# Patient Record
Sex: MALE | Race: WHITE | NOT HISPANIC OR LATINO | Employment: UNEMPLOYED | URBAN - METROPOLITAN AREA
[De-identification: names, ages, dates, MRNs, and addresses within clinical notes are randomized per-mention and may not be internally consistent; named-entity substitution may affect disease eponyms.]

---

## 2023-03-31 ENCOUNTER — HOSPITAL ENCOUNTER (OUTPATIENT)
Dept: DATA CONVERSION | Facility: HOSPITAL | Age: 33
End: 2023-03-31
Attending: SURGERY | Admitting: SURGERY

## 2023-03-31 DIAGNOSIS — M79.89 OTHER SPECIFIED SOFT TISSUE DISORDERS: ICD-10-CM

## 2023-03-31 DIAGNOSIS — Z53.9 PROCEDURE AND TREATMENT NOT CARRIED OUT, UNSPECIFIED REASON: ICD-10-CM

## 2023-03-31 LAB
ANION GAP IN SER/PLAS: 11 MMOL/L (ref 10–20)
CALCIUM (MG/DL) IN SER/PLAS: 8.9 MG/DL (ref 8.6–10.3)
CARBON DIOXIDE, TOTAL (MMOL/L) IN SER/PLAS: 29 MMOL/L (ref 21–32)
CHLORIDE (MMOL/L) IN SER/PLAS: 99 MMOL/L (ref 98–107)
CREATININE (MG/DL) IN SER/PLAS: 0.72 MG/DL (ref 0.5–1.3)
ERYTHROCYTE DISTRIBUTION WIDTH (RATIO) BY AUTOMATED COUNT: 13.3 % (ref 11.5–14.5)
ERYTHROCYTE MEAN CORPUSCULAR HEMOGLOBIN CONCENTRATION (G/DL) BY AUTOMATED: 33.7 G/DL (ref 32–36)
ERYTHROCYTE MEAN CORPUSCULAR VOLUME (FL) BY AUTOMATED COUNT: 88 FL (ref 80–100)
ERYTHROCYTES (10*6/UL) IN BLOOD BY AUTOMATED COUNT: 4.08 X10E12/L (ref 4.5–5.9)
GFR MALE: >90 ML/MIN/1.73M2
GLUCOSE (MG/DL) IN SER/PLAS: 92 MG/DL (ref 74–99)
HEMATOCRIT (%) IN BLOOD BY AUTOMATED COUNT: 35.9 % (ref 41–52)
HEMOGLOBIN (G/DL) IN BLOOD: 12.1 G/DL (ref 13.5–17.5)
LEUKOCYTES (10*3/UL) IN BLOOD BY AUTOMATED COUNT: 6 X10E9/L (ref 4.4–11.3)
PLATELETS (10*3/UL) IN BLOOD AUTOMATED COUNT: 204 X10E9/L (ref 150–450)
POTASSIUM (MMOL/L) IN SER/PLAS: 4 MMOL/L (ref 3.5–5.3)
SODIUM (MMOL/L) IN SER/PLAS: 135 MMOL/L (ref 136–145)
UREA NITROGEN (MG/DL) IN SER/PLAS: 15 MG/DL (ref 6–23)

## 2023-09-07 VITALS — WEIGHT: 165.34 LBS | HEIGHT: 65 IN | BODY MASS INDEX: 27.55 KG/M2

## 2024-08-06 ENCOUNTER — APPOINTMENT (OUTPATIENT)
Dept: GENERAL RADIOLOGY | Age: 34
End: 2024-08-06

## 2024-08-06 ENCOUNTER — HOSPITAL ENCOUNTER (EMERGENCY)
Age: 34
Discharge: HOME | End: 2024-08-07
Attending: EMERGENCY MEDICINE

## 2024-08-06 DIAGNOSIS — S43.014A ANTERIOR DISLOCATION OF RIGHT SHOULDER, INITIAL ENCOUNTER: Primary | ICD-10-CM

## 2024-08-06 PROCEDURE — 2580000003 HC RX 258: Performed by: EMERGENCY MEDICINE

## 2024-08-06 PROCEDURE — 99285 EMERGENCY DEPT VISIT HI MDM: CPT

## 2024-08-06 PROCEDURE — 6360000002 HC RX W HCPCS: Performed by: EMERGENCY MEDICINE

## 2024-08-06 PROCEDURE — 2500000003 HC RX 250 WO HCPCS: Performed by: EMERGENCY MEDICINE

## 2024-08-06 PROCEDURE — 73030 X-RAY EXAM OF SHOULDER: CPT

## 2024-08-06 PROCEDURE — 96374 THER/PROPH/DIAG INJ IV PUSH: CPT

## 2024-08-06 RX ORDER — PROPOFOL 10 MG/ML
0.5 INJECTION, EMULSION INTRAVENOUS ONCE
Status: DISCONTINUED | OUTPATIENT
Start: 2024-08-06 | End: 2024-08-07 | Stop reason: CLARIF

## 2024-08-06 RX ORDER — FENTANYL CITRATE 50 UG/ML
50 INJECTION, SOLUTION INTRAMUSCULAR; INTRAVENOUS ONCE
Status: COMPLETED | OUTPATIENT
Start: 2024-08-06 | End: 2024-08-06

## 2024-08-06 RX ORDER — ETOMIDATE 2 MG/ML
0.15 INJECTION INTRAVENOUS ONCE
Status: COMPLETED | OUTPATIENT
Start: 2024-08-06 | End: 2024-08-06

## 2024-08-06 RX ORDER — 0.9 % SODIUM CHLORIDE 0.9 %
1000 INTRAVENOUS SOLUTION INTRAVENOUS ONCE
Status: COMPLETED | OUTPATIENT
Start: 2024-08-06 | End: 2024-08-06

## 2024-08-06 RX ORDER — BUPRENORPHINE AND NALOXONE 8; 2 MG/1; MG/1
1 FILM, SOLUBLE BUCCAL; SUBLINGUAL 2 TIMES DAILY
COMMUNITY

## 2024-08-06 RX ADMIN — PROPOFOL 40 MG: 10 INJECTION, EMULSION INTRAVENOUS at 23:12

## 2024-08-06 RX ADMIN — PROPOFOL 40 MG: 10 INJECTION, EMULSION INTRAVENOUS at 23:05

## 2024-08-06 RX ADMIN — PROPOFOL 40 MG: 10 INJECTION, EMULSION INTRAVENOUS at 23:13

## 2024-08-06 RX ADMIN — ETOMIDATE 12 MG: 2 INJECTION INTRAVENOUS at 22:55

## 2024-08-06 RX ADMIN — SODIUM CHLORIDE 1000 ML: 9 INJECTION, SOLUTION INTRAVENOUS at 22:48

## 2024-08-06 RX ADMIN — FENTANYL CITRATE 50 MCG: 50 INJECTION, SOLUTION INTRAMUSCULAR; INTRAVENOUS at 21:59

## 2024-08-06 RX ADMIN — PROPOFOL 80 MG: 10 INJECTION, EMULSION INTRAVENOUS at 22:24

## 2024-08-06 RX ADMIN — PROPOFOL 40 MG: 10 INJECTION, EMULSION INTRAVENOUS at 23:11

## 2024-08-06 ASSESSMENT — PAIN SCALES - GENERAL
PAINLEVEL_OUTOF10: 10

## 2024-08-06 ASSESSMENT — ENCOUNTER SYMPTOMS
VOMITING: 0
ABDOMINAL PAIN: 0
NAUSEA: 0
DIARRHEA: 0
EYE DISCHARGE: 0
SORE THROAT: 0
EYE PAIN: 0
BACK PAIN: 0
WHEEZING: 0
SHORTNESS OF BREATH: 0
EYE REDNESS: 0
SINUS PRESSURE: 0
COUGH: 0

## 2024-08-06 ASSESSMENT — PAIN DESCRIPTION - LOCATION
LOCATION: SHOULDER
LOCATION: SHOULDER

## 2024-08-06 ASSESSMENT — PAIN DESCRIPTION - ORIENTATION
ORIENTATION: RIGHT
ORIENTATION: RIGHT

## 2024-08-07 ENCOUNTER — APPOINTMENT (OUTPATIENT)
Dept: GENERAL RADIOLOGY | Age: 34
End: 2024-08-07

## 2024-08-07 VITALS
HEART RATE: 69 BPM | WEIGHT: 175 LBS | RESPIRATION RATE: 11 BRPM | SYSTOLIC BLOOD PRESSURE: 111 MMHG | TEMPERATURE: 98.2 F | OXYGEN SATURATION: 97 % | DIASTOLIC BLOOD PRESSURE: 89 MMHG | BODY MASS INDEX: 29.12 KG/M2

## 2024-08-07 PROCEDURE — 73030 X-RAY EXAM OF SHOULDER: CPT

## 2024-08-07 PROCEDURE — 23650 CLTX SHO DSLC W/MNPJ WO ANES: CPT

## 2024-08-07 PROCEDURE — 2500000003 HC RX 250 WO HCPCS: Performed by: EMERGENCY MEDICINE

## 2024-08-07 PROCEDURE — 99152 MOD SED SAME PHYS/QHP 5/>YRS: CPT

## 2024-08-07 PROCEDURE — 6360000002 HC RX W HCPCS: Performed by: EMERGENCY MEDICINE

## 2024-08-07 RX ORDER — PROPOFOL 10 MG/ML
40 INJECTION, EMULSION INTRAVENOUS ONCE
Status: DISCONTINUED | OUTPATIENT
Start: 2024-08-07 | End: 2024-08-07 | Stop reason: HOSPADM

## 2024-08-07 RX ORDER — PROPOFOL 10 MG/ML
80 INJECTION, EMULSION INTRAVENOUS ONCE
Status: COMPLETED | OUTPATIENT
Start: 2024-08-07 | End: 2024-08-06

## 2024-08-07 RX ORDER — PROPOFOL 10 MG/ML
80 INJECTION, EMULSION INTRAVENOUS ONCE
Status: COMPLETED | OUTPATIENT
Start: 2024-08-07 | End: 2024-08-07

## 2024-08-07 RX ORDER — PROPOFOL 10 MG/ML
0.5 INJECTION, EMULSION INTRAVENOUS ONCE
Status: COMPLETED | OUTPATIENT
Start: 2024-08-07 | End: 2024-08-07

## 2024-08-07 RX ORDER — KETAMINE HYDROCHLORIDE 10 MG/ML
0.5 INJECTION, SOLUTION INTRAMUSCULAR; INTRAVENOUS ONCE
Status: COMPLETED | OUTPATIENT
Start: 2024-08-07 | End: 2024-08-07

## 2024-08-07 RX ADMIN — PROPOFOL 80 MG: 10 INJECTION, EMULSION INTRAVENOUS at 02:14

## 2024-08-07 RX ADMIN — PROPOFOL 40 MG: 10 INJECTION, EMULSION INTRAVENOUS at 02:07

## 2024-08-07 RX ADMIN — KETAMINE HYDROCHLORIDE 39.7 MG: 10 INJECTION INTRAMUSCULAR; INTRAVENOUS at 02:07

## 2024-08-07 ASSESSMENT — PAIN DESCRIPTION - LOCATION: LOCATION: SHOULDER

## 2024-08-07 ASSESSMENT — PAIN SCALES - GENERAL: PAINLEVEL_OUTOF10: 10

## 2024-08-07 ASSESSMENT — PAIN DESCRIPTION - ORIENTATION: ORIENTATION: RIGHT

## 2024-08-07 NOTE — ED PROVIDER NOTES
Sling applied to right arm by orthopedic Resident under my direct supervision. Patient remained neurovascularly intact before and after the procedure.    Disposition Considerations (Tests not ordered but considered, Shared Decision Making, Pt Expectation of Test or Tx.): Differential diagnoses include right proximal humerus fracture and right shoulder dislocation.  Appropriate for outpatient management        I am the Primary Clinician of Record.              No visits with results within 1 Day(s) from this visit.   Latest known visit with results is:   Admission on 01/29/2016, Discharged on 01/29/2016   Component Date Value Ref Range Status    Hep C Ab Interp 01/29/2016 Non-Reactive  NON REACT Final    Hep B S Ag Interp 01/29/2016 Non-Reactive  NON REACT Final    HIV-1/HIV-2 Ab 01/29/2016 Non-Reactive  NON REACT Final              --------------------------------------------- PAST HISTORY ---------------------------------------------  Past Medical History:  has no past medical history on file.    Past Surgical History:  has no past surgical history on file.    Social History:  reports that he has been smoking. He does not have any smokeless tobacco history on file. He reports that he does not drink alcohol and does not use drugs.    Family History: family history is not on file.     The patient’s home medications have been reviewed.    Allergies: Patient has no known allergies.    -------------------------------------------------- RESULTS -------------------------------------------------  Labs:  No results found for this visit on 08/06/24.    Radiology:  XR SHOULDER RIGHT (MIN 2 VIEWS)   Final Result   Anterior inferior right shoulder dislocation.  No evidence of an osseous   Bankart lesion.      RECOMMENDATION:   Recommend post reduction radiographs.         XR SHOULDER RIGHT (MIN 2 VIEWS)    (Results Pending)   XR SHOULDER RIGHT (MIN 2 VIEWS)    (Results Pending)       ------------------------- NURSING NOTES AND

## 2024-08-07 NOTE — CONSULTS
Patient was placed in a shoulder immobilizer.  Tolerated this well.    Radiology Review:  X-ray right shoulder prereduction: Demonstrate anterior-inferior dislocated right shoulder.  No other abnormalities noted    X-ray right shoulder postreduction: Demonstrates properly aligned glenohumeral joint seen on both Grashey view and Scap Y view.  Patient does have significant Hill-Sachs lesion present with displaced bone.  No other abnormality seen.    IMPRESSION:  Right shoulder dislocation    PLAN:  Plan discussed with patient all patient's questions answered to his satisfaction  Discussed with patient that his reduction went well but he has a very unstable shoulder at this time with large Hill-Sachs lesion and most likely Bankart lesion present as well leading to shoulder instability.  Discussed with him the importance of maintaining his shoulder immobilizer as any small movement could cause his shoulder to have redislocated at this time which is also a likely possibility given the significant instability of the shoulder.  Until seen in office for repeat imaging to ensure his shoulder is still reduced.  Patient understandable and agreeable to this  Pain control per ED  Medical management appreciated  Nonweightbearing right upper extremity with strict maintenance of his right shoulder immobilizer  Plan: Patient will follow-up outpatient with Dr. Webber in the next 2 to 3 days for repeat evaluation imaging.  Patient is cleared for discharge from orthopedic standpoint once proper discharge plan is in place.  We signing off on this patient.  Please call with any questions you have.  D/W attending  Electronically signed by Adi Fernandes DO on 8/7/2024 at 2:33 AM

## 2024-08-07 NOTE — ED NOTES
Ortho resident at bedside for shoulder reduction.  Dr. Severino and Dr. MICH Lee also at the bedside

## 2024-08-07 NOTE — ED NOTES
Shoulder still out of place.  Dr. Lee and  AT THE BEDSIDE TO RE SEDate with  additional 80mg Proprofol.

## 2024-08-09 ENCOUNTER — OFFICE VISIT (OUTPATIENT)
Dept: ORTHOPEDIC SURGERY | Age: 34
End: 2024-08-09
Payer: COMMERCIAL

## 2024-08-09 VITALS — WEIGHT: 175 LBS | BODY MASS INDEX: 29.16 KG/M2 | TEMPERATURE: 98 F | HEIGHT: 65 IN

## 2024-08-09 DIAGNOSIS — S43.004A DISLOCATION OF RIGHT SHOULDER JOINT, INITIAL ENCOUNTER: Primary | ICD-10-CM

## 2024-08-09 PROCEDURE — G8419 CALC BMI OUT NRM PARAM NOF/U: HCPCS | Performed by: ORTHOPAEDIC SURGERY

## 2024-08-09 PROCEDURE — 99203 OFFICE O/P NEW LOW 30 MIN: CPT | Performed by: ORTHOPAEDIC SURGERY

## 2024-08-09 PROCEDURE — 4004F PT TOBACCO SCREEN RCVD TLK: CPT | Performed by: ORTHOPAEDIC SURGERY

## 2024-08-09 PROCEDURE — G8427 DOCREV CUR MEDS BY ELIG CLIN: HCPCS | Performed by: ORTHOPAEDIC SURGERY

## 2024-08-09 NOTE — PROGRESS NOTES
Charlie Stephen is a 34 y.o. male, who presents   Chief Complaint   Patient presents with    Shoulder Injury     Right shoulder dislocation slipped and fell and tried to push up and dislocated it. Previous hx of dislocation.       HPI:: Charlie fell on a slick porch 2 days ago.  He dislocated his right shoulder.  He went to [facility] and was x-rayed and apparently reduced there.  He was apparently reduced by orthopedics because of the multiple attempts by ER physicians were unsuccessful.    He does have a history of a previous shoulder dislocation at age 16.  He had a closed reduction of that [place].  Presently he is in the sling and swath dressing.  He is complaining of some pain in the shoulder as expected.      Allergies; medications; past medical, surgical, family, and social history; and problem list have been reviewed today and updated as indicated in this encounter - see below following Ortho specifics.    Musculoskeletal: Skin condition gross neurovascular functions good in right upper extremity.  He is very tender around the right shoulder to palpation.  Motion was not tested because of his known injury.  He has some cephalad prominence of both distal clavicles which apparently is his anatomic variation.  The sulcus sign is negative implying that his shoulder is still reduced.  It hangs in a normal alignment.  The sling and swath was adjusted to better support his arm.    Radiologic Studies: Imaging shows initially a dislocation of the right shoulder anterior subcoracoid and there is a Hill-Sachs lesion noted.    Subsequent films show good reduction of the shoulder.    ASSESSMENT:  Charlie was seen today for shoulder injury.    Diagnoses and all orders for this visit:    Dislocation of right shoulder joint, initial encounter     Treatment alternatives were reviewed including medical and physical therapies, injections, and surgical options, expected risks benefits and likely outcome of each were